# Patient Record
Sex: MALE | Race: WHITE | NOT HISPANIC OR LATINO | URBAN - METROPOLITAN AREA
[De-identification: names, ages, dates, MRNs, and addresses within clinical notes are randomized per-mention and may not be internally consistent; named-entity substitution may affect disease eponyms.]

---

## 2019-07-18 ENCOUNTER — OUTPATIENT (OUTPATIENT)
Dept: OUTPATIENT SERVICES | Facility: HOSPITAL | Age: 64
LOS: 1 days | Discharge: HOME | End: 2019-07-18
Payer: MEDICARE

## 2019-07-18 PROBLEM — Z00.00 ENCOUNTER FOR PREVENTIVE HEALTH EXAMINATION: Status: ACTIVE | Noted: 2019-07-18

## 2019-07-22 ENCOUNTER — APPOINTMENT (OUTPATIENT)
Dept: RADIATION ONCOLOGY | Facility: CLINIC | Age: 64
End: 2019-07-22
Payer: MEDICARE

## 2019-07-22 VITALS — BODY MASS INDEX: 22.26 KG/M2 | WEIGHT: 168 LBS | HEIGHT: 73 IN

## 2019-07-22 DIAGNOSIS — Z92.21 PERSONAL HISTORY OF ANTINEOPLASTIC CHEMOTHERAPY: ICD-10-CM

## 2019-07-22 DIAGNOSIS — Z85.46 PERSONAL HISTORY OF MALIGNANT NEOPLASM OF PROSTATE: ICD-10-CM

## 2019-07-22 DIAGNOSIS — Z92.3 PERSONAL HISTORY OF IRRADIATION: ICD-10-CM

## 2019-07-22 PROCEDURE — 77262 THER RADIOLOGY TX PLNG INTRM: CPT

## 2019-07-22 PROCEDURE — 99203 OFFICE O/P NEW LOW 30 MIN: CPT | Mod: 25

## 2019-08-06 ENCOUNTER — OTHER (OUTPATIENT)
Age: 64
End: 2019-08-06

## 2019-08-07 ENCOUNTER — APPOINTMENT (OUTPATIENT)
Dept: NUCLEAR MEDICINE | Facility: HOSPITAL | Age: 64
End: 2019-08-07
Payer: MEDICARE

## 2019-08-07 ENCOUNTER — OUTPATIENT (OUTPATIENT)
Dept: OUTPATIENT SERVICES | Facility: HOSPITAL | Age: 64
LOS: 1 days | End: 2019-08-07

## 2019-08-07 ENCOUNTER — APPOINTMENT (OUTPATIENT)
Dept: RADIATION ONCOLOGY | Facility: CLINIC | Age: 64
End: 2019-08-07
Payer: MEDICARE

## 2019-08-07 VITALS — BODY MASS INDEX: 20.71 KG/M2 | WEIGHT: 157 LBS | SYSTOLIC BLOOD PRESSURE: 110 MMHG | DIASTOLIC BLOOD PRESSURE: 70 MMHG

## 2019-08-07 DIAGNOSIS — C73 MALIGNANT NEOPLASM OF THYROID GLAND: ICD-10-CM

## 2019-08-07 PROCEDURE — 79101 NUCLEAR RX IV ADMIN: CPT | Mod: 26

## 2019-08-07 PROCEDURE — 99213 OFFICE O/P EST LOW 20 MIN: CPT | Mod: 25

## 2019-08-07 PROCEDURE — 77300 RADIATION THERAPY DOSE PLAN: CPT | Mod: 26,59

## 2019-08-07 NOTE — PHYSICAL EXAM
[Thin] : thin [Normal] : normal external genitalia without lesions and no testicular masses [Oriented To Time, Place, And Person] : oriented to person, place, and time [Normal] : oriented to person, place and time, the affect was normal, the mood was normal and not anxious [Mood] : the mood was normal [Affect] : the affect was normal [Not Anxious] : not anxious [de-identified] : limited ROM [de-identified] : no spinal tenderness to percussion, cautious gait, no limping,ataxia, good muscle tone in all limbs [de-identified] : scattered wheezing

## 2019-08-07 NOTE — PHYSICAL EXAM
[Thin] : thin [Normal] : normal external genitalia without lesions and no testicular masses [Oriented To Time, Place, And Person] : oriented to person, place, and time [Normal] : oriented to person, place and time, the affect was normal, the mood was normal and not anxious [Mood] : the mood was normal [Affect] : the affect was normal [Not Anxious] : not anxious [de-identified] : limited ROM [de-identified] : no spinal tenderness to percussion, cautious gait, no limping,ataxia, good muscle tone in all limbs [de-identified] : scattered wheezing

## 2019-08-07 NOTE — VITALS
[Maximal Pain Intensity: 5/10] : 5/10 [Least Pain Intensity: 3/10] : 3/10 [70: Cares for self; unalbe to carry on normal activity or do active work.] : 70: Cares for self; unable to carry on normal activity or do active work. [7 - Distress Level] : Distress Level: 7 [ECOG Performance Status: 0 - Fully active, able to carry on all pre-disease performance without restriction] : Performance Status: 0 - Fully active, able to carry on all pre-disease performance without restriction

## 2019-08-07 NOTE — HISTORY OF PRESENT ILLNESS
[FreeTextEntry1] : Parvez Ceja is a 63 year old man with castrate resistant metastatic prostate cancer, organ sparing, with osseous metastatic disease here to discuss Xofigo injections.He had radiation to the LS spine in February of this year at [facility], records have been requested.Mr Ceja was referred by dr Fang Dorado, [facility] oncologist at [facility], for consideration of Xofigo treatment for mCRPC.\par \par He is a good historian and the notes below are from the patient after discussion.  Consult notes from Dr. Dorado are pending. \par \par Mr. Parvez Ceja is a 62 y/o male with mCRPC, here for his first Xofigo treatment. He was diagnosed in 2017 having already osseous metastases noted.  In 2018 he was on Zytiga.  He then was placed on Taxol and his last dose was one month ago, stopped for poor tolerance and disease progression.\par In 2018 with Dr. Villalpando, he had RT, 10 fractions to the spine and pelvis- notes are pending. \par \par He does not use a cane or walker to ambulate but indicates aches and pains to the bones of his legs, arms, and lower back. Describes L-spine/buttock pain as 6/10. His major concern is poor weight maintenance.\par \par labs reviewed from August 6 2019:\par H/h: 13.1/36.7\par \par PSA: 244\par \par labs reviewed from 7/16/19: \par 13.5/39.2\par \par .77\par

## 2019-08-07 NOTE — LETTER GREETING
[Consult Letter:] : Your patient, [unfilled] was seen in my office today for consultation. [Dear  ___] : Dear  [unfilled],

## 2019-08-07 NOTE — REASON FOR VISIT
[Bone Metastasis] : bone metastasis [Consideration for Non-Curative Therapy] : consideration for non-curative therapy for [Prostate Cancer] : prostate cancer

## 2019-08-07 NOTE — HISTORY OF PRESENT ILLNESS
[FreeTextEntry1] : Parvez Ceja is a 63 year old man with castrate resistant metastatic prostate cancer, organ sparing, with osseous metastatic disease here to discuss Xofigo injections.He had radiation to the LS spine in February of this year at [facility], records have been requested.Mr Ceja was referred by dr Fang Dorado, [facility] oncologist at [facility], for consideration of Xofigo treatment for mCRPC.\par \par He is a good historian and the notes below are from the patient after discussion.  Consult notes from Dr. Dorado are pending. \par \par Mr. Parvez Ceja is a 64 y/o male with mCRPC, here for his first Xofigo treatment. He was diagnosed in 2017 having already osseous metastases noted.  In 2018 he was on Zytiga.  He then was placed on Taxol and his last dose was one month ago, stopped for poor tolerance and disease progression.\par In 2018 with Dr. Villalpando, he had RT, 10 fractions to the spine and pelvis- notes are pending. \par \par He does not use a cane or walker to ambulate but indicates aches and pains to the bones of his legs, arms, and lower back. Describes L-spine/buttock pain as 6/10. His major concern is poor weight maintenance.\par \par labs reviewed from August 6 2019:\par H/h: 13.1/36.7\par \par PSA: 244\par \par labs reviewed from 7/16/19: \par 13.5/39.2\par \par .77\par

## 2019-08-07 NOTE — REVIEW OF SYSTEMS
[Constipation] : constipation [Joint Pain] : joint pain [Muscle Weakness] : muscle weakness [Muscle Pain] : muscle pain [Disturbance Of Gait] : gait disturbance [Difficulty Walking] : difficulty walking [Negative] : Psychiatric

## 2019-08-07 NOTE — REVIEW OF SYSTEMS
[Joint Pain] : joint pain [Constipation] : constipation [Muscle Weakness] : muscle weakness [Muscle Pain] : muscle pain [Disturbance Of Gait] : gait disturbance [Difficulty Walking] : difficulty walking [Negative] : Psychiatric

## 2019-08-07 NOTE — REASON FOR VISIT
[Consideration for Non-Curative Therapy] : consideration for non-curative therapy for [Bone Metastasis] : bone metastasis [Prostate Cancer] : prostate cancer

## 2019-09-04 ENCOUNTER — APPOINTMENT (OUTPATIENT)
Dept: NUCLEAR MEDICINE | Facility: HOSPITAL | Age: 64
End: 2019-09-04
Payer: MEDICARE

## 2019-09-04 ENCOUNTER — OUTPATIENT (OUTPATIENT)
Dept: OUTPATIENT SERVICES | Facility: HOSPITAL | Age: 64
LOS: 1 days | End: 2019-09-04

## 2019-09-04 ENCOUNTER — APPOINTMENT (OUTPATIENT)
Dept: RADIATION ONCOLOGY | Facility: CLINIC | Age: 64
End: 2019-09-04
Payer: MEDICARE

## 2019-09-04 VITALS — SYSTOLIC BLOOD PRESSURE: 118 MMHG | WEIGHT: 162 LBS | BODY MASS INDEX: 21.37 KG/M2 | DIASTOLIC BLOOD PRESSURE: 60 MMHG

## 2019-09-04 DIAGNOSIS — C73 MALIGNANT NEOPLASM OF THYROID GLAND: ICD-10-CM

## 2019-09-04 PROCEDURE — 99213 OFFICE O/P EST LOW 20 MIN: CPT | Mod: 25

## 2019-09-04 PROCEDURE — 79101 NUCLEAR RX IV ADMIN: CPT | Mod: 26

## 2019-09-04 PROCEDURE — 77300 RADIATION THERAPY DOSE PLAN: CPT | Mod: 26

## 2019-09-04 NOTE — VITALS
[Maximal Pain Intensity: 6/10] : 6/10 [Least Pain Intensity: 5/10] : 5/10 [70: Cares for self; unalbe to carry on normal activity or do active work.] : 70: Cares for self; unable to carry on normal activity or do active work. [ECOG Performance Status: 0 - Fully active, able to carry on all pre-disease performance without restriction] : Performance Status: 0 - Fully active, able to carry on all pre-disease performance without restriction

## 2019-09-04 NOTE — HISTORY OF PRESENT ILLNESS
[FreeTextEntry1] : \par Mr. Parvez Ceja is a 62 y/o male with mCRPC, here for his 2nd  Xofigo treatment. He was diagnosed in 2017 having already osseous metastases noted. In 2018 he was on Zytiga. He then was placed on Taxol and his last dose was one to two months ago, stopped for poor tolerance and disease progression.\par In 2018 with Dr. Villalpando, he had RT, 10 fractions to the spine and pelvis-\par \par He is under the care of Dr. Dorado, his [facility] oncologist, who saw him about 10 days ago.\par \par His pain is localized to this pelvic region, b/l lower extremities and mid to lower back pain. He describes marked improvement in his pain in the hips/pelvis, now 2-3/10, and improvement in appetite, weight 162 lbs today.\par \par he gives a recent h/o hospitalization at [facility] for opioid induced constipation which is now improved and he feels well. \par maintained on 37.5 fentanyl patch down from 50mcg.  also has decreased any po opioids. \par \par \par labs were drawn and reviewed from 8/27/19: \par H/H: 11.2/ 34.6\par \par PSA 76.36\par testosterone 3 ng/dl

## 2019-09-04 NOTE — PHYSICAL EXAM
[General Appearance - Well Nourished] : well nourished [General Appearance - In No Acute Distress] : in no acute distress [General Appearance - Alert] : alert [Thin] : thin [Normal] : no focal deficits

## 2019-10-01 PROCEDURE — 77262 THER RADIOLOGY TX PLNG INTRM: CPT

## 2019-10-02 ENCOUNTER — APPOINTMENT (OUTPATIENT)
Dept: RADIATION ONCOLOGY | Facility: CLINIC | Age: 64
End: 2019-10-02
Payer: MEDICARE

## 2019-10-02 ENCOUNTER — APPOINTMENT (OUTPATIENT)
Dept: NUCLEAR MEDICINE | Facility: HOSPITAL | Age: 64
End: 2019-10-02
Payer: MEDICARE

## 2019-10-02 ENCOUNTER — OUTPATIENT (OUTPATIENT)
Dept: OUTPATIENT SERVICES | Facility: HOSPITAL | Age: 64
LOS: 1 days | End: 2019-10-02

## 2019-10-02 VITALS
WEIGHT: 165 LBS | DIASTOLIC BLOOD PRESSURE: 60 MMHG | RESPIRATION RATE: 18 BRPM | BODY MASS INDEX: 21.77 KG/M2 | SYSTOLIC BLOOD PRESSURE: 120 MMHG

## 2019-10-02 DIAGNOSIS — C61 MALIGNANT NEOPLASM OF PROSTATE: ICD-10-CM

## 2019-10-02 PROCEDURE — 99212 OFFICE O/P EST SF 10 MIN: CPT | Mod: 25

## 2019-10-02 PROCEDURE — 79101 NUCLEAR RX IV ADMIN: CPT | Mod: 26

## 2019-10-02 PROCEDURE — 77300 RADIATION THERAPY DOSE PLAN: CPT | Mod: 26

## 2019-10-02 NOTE — HISTORY OF PRESENT ILLNESS
[FreeTextEntry1] : \par Mr. Parvez Ceja is a 62 y/o male with mCRPC, here for his 3rd Xofigo treatment. He was diagnosed in 2017 having already osseous metastases noted. In 2018 he was on Zytiga. He then was placed on Taxol and his last dose was one to two months ago, stopped for poor tolerance and disease progression.\par In 2018 with Dr. Villalpando, he had RT, 10 fractions to the spine and pelvis- Mr Ceja describes some non-progressive pain when sitting, but other wise feels comfortable, with good appetite, no change in energy level.\par \par He is under the care of Dr. Dorado, his [facility] oncologist.\par \par HIs PSA has drastically reduced from August to September.\par \par labs from [facility] on 9/24: \par H/H: 11/33.3\par ALP 59\par PSA 8.4\par \par labs were drawn and reviewed from 8/27/19: \par H/H: 11.2/ 34.6\par \par PSA 76.36\par testosterone 3 ng/dl

## 2019-10-02 NOTE — PHYSICAL EXAM
[General Appearance - Well Nourished] : well nourished [General Appearance - Alert] : alert [Thin] : thin [General Appearance - In No Acute Distress] : in no acute distress [Normal] : no focal deficits

## 2019-10-12 ENCOUNTER — INBOUND DOCUMENT (OUTPATIENT)
Age: 64
End: 2019-10-12

## 2019-10-30 ENCOUNTER — APPOINTMENT (OUTPATIENT)
Dept: NUCLEAR MEDICINE | Facility: HOSPITAL | Age: 64
End: 2019-10-30
Payer: MEDICARE

## 2019-10-30 ENCOUNTER — OUTPATIENT (OUTPATIENT)
Dept: OUTPATIENT SERVICES | Facility: HOSPITAL | Age: 64
LOS: 1 days | End: 2019-10-30

## 2019-10-30 ENCOUNTER — APPOINTMENT (OUTPATIENT)
Dept: RADIATION ONCOLOGY | Facility: CLINIC | Age: 64
End: 2019-10-30
Payer: MEDICARE

## 2019-10-30 VITALS — SYSTOLIC BLOOD PRESSURE: 130 MMHG | WEIGHT: 163 LBS | BODY MASS INDEX: 21.51 KG/M2 | DIASTOLIC BLOOD PRESSURE: 64 MMHG

## 2019-10-30 DIAGNOSIS — C61 MALIGNANT NEOPLASM OF PROSTATE: ICD-10-CM

## 2019-10-30 PROCEDURE — 79101 NUCLEAR RX IV ADMIN: CPT | Mod: 26

## 2019-10-30 PROCEDURE — 99212 OFFICE O/P EST SF 10 MIN: CPT | Mod: 25

## 2019-10-30 PROCEDURE — 77300 RADIATION THERAPY DOSE PLAN: CPT | Mod: 26

## 2019-10-30 PROCEDURE — 77262 THER RADIOLOGY TX PLNG INTRM: CPT

## 2019-10-30 NOTE — HISTORY OF PRESENT ILLNESS
[FreeTextEntry1] : Parvez Ceja is here today for his 4th Xofigo injection. tolerating treatment well with no complaints today. Minimal vague abd. discomfort at times, not progressive\par \par weight 163 lbs. \par \par PSA 7.25 10/2019\par \par \par CBC from 10/21/19 : \par WBC 3500\par H/H 12.2/35.9\par Plts 145,000

## 2019-10-30 NOTE — PHYSICAL EXAM
[General Appearance - Well Developed] : well developed [General Appearance - Alert] : alert [General Appearance - In No Acute Distress] : in no acute distress [Thin] : thin [Normal] : no joint swelling, no clubbing or cyanosis of the fingernails and muscle strength and tone were normal

## 2019-10-30 NOTE — VITALS
[Maximal Pain Intensity: 4/10] : 4/10 [Least Pain Intensity: 3/10] : 3/10 [70: Cares for self; unalbe to carry on normal activity or do active work.] : 70: Cares for self; unable to carry on normal activity or do active work.

## 2019-11-08 ENCOUNTER — INBOUND DOCUMENT (OUTPATIENT)
Age: 64
End: 2019-11-08

## 2019-12-04 ENCOUNTER — APPOINTMENT (OUTPATIENT)
Dept: NUCLEAR MEDICINE | Facility: HOSPITAL | Age: 64
End: 2019-12-04
Payer: MEDICARE

## 2019-12-04 ENCOUNTER — OUTPATIENT (OUTPATIENT)
Dept: OUTPATIENT SERVICES | Facility: HOSPITAL | Age: 64
LOS: 1 days | End: 2019-12-04

## 2019-12-04 ENCOUNTER — APPOINTMENT (OUTPATIENT)
Dept: RADIATION ONCOLOGY | Facility: CLINIC | Age: 64
End: 2019-12-04
Payer: MEDICARE

## 2019-12-04 DIAGNOSIS — C61 MALIGNANT NEOPLASM OF PROSTATE: ICD-10-CM

## 2019-12-04 PROCEDURE — 77262 THER RADIOLOGY TX PLNG INTRM: CPT

## 2019-12-04 PROCEDURE — 99212 OFFICE O/P EST SF 10 MIN: CPT | Mod: 25

## 2019-12-04 PROCEDURE — 77300 RADIATION THERAPY DOSE PLAN: CPT | Mod: 26

## 2019-12-04 PROCEDURE — 79101 NUCLEAR RX IV ADMIN: CPT | Mod: 26

## 2019-12-04 NOTE — PHYSICAL EXAM
[General Appearance - Well Developed] : well developed [General Appearance - Alert] : alert [General Appearance - In No Acute Distress] : in no acute distress [Thin] : thin [Normal] : no focal deficits

## 2019-12-09 NOTE — HISTORY OF PRESENT ILLNESS
[FreeTextEntry1] : Parvez Ceja is here today for his 5th Xofigo injection. tolerating treatment well with no complaints today. Minimal vague abd. discomfort at times, not progressive.\par He has f/u with Dr. Dorado. \par \par labs reviewed from 11/25/19: \par WBC 3400\par H/H: 12.7/37.\par Plts 136,000\par ALP: 48\par PSA: 9.82\par \par \par CBC from 11/25/19 : \par WBC 3500\par H/H 12.2/35.9\par Plts 145,000

## 2019-12-13 ENCOUNTER — INBOUND DOCUMENT (OUTPATIENT)
Age: 64
End: 2019-12-13

## 2020-01-21 ENCOUNTER — OUTPATIENT (OUTPATIENT)
Dept: OUTPATIENT SERVICES | Facility: HOSPITAL | Age: 65
LOS: 1 days | Discharge: HOME | End: 2020-01-21
Payer: MEDICARE

## 2020-01-21 PROCEDURE — 77262 THER RADIOLOGY TX PLNG INTRM: CPT

## 2020-01-22 ENCOUNTER — APPOINTMENT (OUTPATIENT)
Dept: NUCLEAR MEDICINE | Facility: HOSPITAL | Age: 65
End: 2020-01-22
Payer: MEDICARE

## 2020-01-22 ENCOUNTER — APPOINTMENT (OUTPATIENT)
Dept: RADIATION ONCOLOGY | Facility: CLINIC | Age: 65
End: 2020-01-22
Payer: MEDICARE

## 2020-01-22 ENCOUNTER — OTHER (OUTPATIENT)
Age: 65
End: 2020-01-22

## 2020-01-22 ENCOUNTER — OUTPATIENT (OUTPATIENT)
Dept: OUTPATIENT SERVICES | Facility: HOSPITAL | Age: 65
LOS: 1 days | End: 2020-01-22

## 2020-01-22 DIAGNOSIS — C61 MALIGNANT NEOPLASM OF PROSTATE: ICD-10-CM

## 2020-01-22 PROCEDURE — 77300 RADIATION THERAPY DOSE PLAN: CPT | Mod: 26

## 2020-01-22 PROCEDURE — 79101 NUCLEAR RX IV ADMIN: CPT | Mod: 26

## 2020-01-22 PROCEDURE — 99213 OFFICE O/P EST LOW 20 MIN: CPT | Mod: 25

## 2020-01-22 NOTE — HISTORY OF PRESENT ILLNESS
[FreeTextEntry1] : Mr. Parvez Ceja is a 62 y/o male with mCRPC, here for his 6th and last Xofigo treatment. He was diagnosed in 2017 having already osseous metastases noted. In 2018 he was on Zytiga. He then was placed on Taxol and his last dose was about 4 months ago, stopped for poor tolerance and disease progression.\par In 2018 with Dr. Villalpando, he had RT, 10 fractions to the spine and pelvis- \par Mr Ceja describes mild intermittent bilateral shoulder discomfort for the last few weeks, not progressive. He also has diarrhea and cramping for a few days after each treatment, which then more or less resolves. He denies nausea,vomiting.\par weight today is 161 lbs fully clothed.\par \par labs:  1/13/20\par H/H: 12.5/36.6\par \par ALP 57\par PSA 21.81  (PSA on 1/21/2019 was 71)

## 2020-01-22 NOTE — PHYSICAL EXAM
[Thin] : thin [Normal] : no joint swelling, no clubbing or cyanosis of the fingernails and muscle strength and tone were normal

## 2023-10-01 PROBLEM — Z92.3 HISTORY OF RADIATION THERAPY: Status: RESOLVED | Noted: 2019-07-22 | Resolved: 2023-10-01
